# Patient Record
Sex: MALE | Race: ASIAN | NOT HISPANIC OR LATINO
[De-identification: names, ages, dates, MRNs, and addresses within clinical notes are randomized per-mention and may not be internally consistent; named-entity substitution may affect disease eponyms.]

---

## 2021-08-09 ENCOUNTER — APPOINTMENT (OUTPATIENT)
Dept: CARDIOLOGY | Facility: CLINIC | Age: 66
End: 2021-08-09
Payer: COMMERCIAL

## 2021-08-09 VITALS
TEMPERATURE: 97.8 F | DIASTOLIC BLOOD PRESSURE: 79 MMHG | WEIGHT: 165 LBS | RESPIRATION RATE: 18 BRPM | HEART RATE: 67 BPM | HEIGHT: 66 IN | OXYGEN SATURATION: 96 % | SYSTOLIC BLOOD PRESSURE: 124 MMHG | BODY MASS INDEX: 26.52 KG/M2

## 2021-08-09 DIAGNOSIS — R00.2 PALPITATIONS: ICD-10-CM

## 2021-08-09 DIAGNOSIS — Z82.49 FAMILY HISTORY OF ISCHEMIC HEART DISEASE AND OTHER DISEASES OF THE CIRCULATORY SYSTEM: ICD-10-CM

## 2021-08-09 PROBLEM — Z00.00 ENCOUNTER FOR PREVENTIVE HEALTH EXAMINATION: Status: ACTIVE | Noted: 2021-08-09

## 2021-08-09 PROCEDURE — 99204 OFFICE O/P NEW MOD 45 MIN: CPT | Mod: 25

## 2021-08-12 ENCOUNTER — APPOINTMENT (OUTPATIENT)
Dept: CARDIOLOGY | Facility: CLINIC | Age: 66
End: 2021-08-12
Payer: COMMERCIAL

## 2021-08-12 VITALS — DIASTOLIC BLOOD PRESSURE: 77 MMHG | TEMPERATURE: 97.6 F | SYSTOLIC BLOOD PRESSURE: 128 MMHG

## 2021-08-12 PROCEDURE — 93015 CV STRESS TEST SUPVJ I&R: CPT

## 2021-08-12 PROCEDURE — 93306 TTE W/DOPPLER COMPLETE: CPT

## 2021-08-12 RX ORDER — NITROGLYCERIN 0.4 MG/1
0.4 TABLET SUBLINGUAL
Qty: 1 | Refills: 0 | Status: ACTIVE | COMMUNITY
Start: 2021-08-12 | End: 1900-01-01

## 2021-08-13 ENCOUNTER — NON-APPOINTMENT (OUTPATIENT)
Age: 66
End: 2021-08-13

## 2021-08-20 ENCOUNTER — NON-APPOINTMENT (OUTPATIENT)
Age: 66
End: 2021-08-20

## 2021-08-21 PROBLEM — R00.2 PALPITATIONS: Status: ACTIVE | Noted: 2021-08-12

## 2021-08-21 NOTE — HISTORY OF PRESENT ILLNESS
[FreeTextEntry1] : 66 year-old male with HLD  presents for evaluation of lightheadedness. Patient reports that he had been feeling lightheadedness and seeing black, with near syncopal episodes once or twice monthly.. Patient reports that his HR is only 52. Patient reports substernal CP, described as stabbing, not related to exertion, lasting 10 to 45 minutes. Patient reports SOB whole day. Patient also reports palpitations with strong beats. Patient denies h/o syncope. He also reports HA. He is going to get Carotid Doppler. BP was 130-130-125. He felt slightly dizzy when he stood up. Patient completed Covid Vaccine with no issues.  ECG for CP.   I advised patient to undergo an echocardiogram and a treadmill stress test.  I advised patient to wear a Holter monitor.

## 2021-08-23 ENCOUNTER — NON-APPOINTMENT (OUTPATIENT)
Age: 66
End: 2021-08-23

## 2021-08-30 ENCOUNTER — APPOINTMENT (OUTPATIENT)
Dept: CARDIOLOGY | Facility: CLINIC | Age: 66
End: 2021-08-30
Payer: COMMERCIAL

## 2021-08-30 VITALS
BODY MASS INDEX: 26.31 KG/M2 | WEIGHT: 163 LBS | RESPIRATION RATE: 18 BRPM | TEMPERATURE: 97.8 F | HEART RATE: 57 BPM | SYSTOLIC BLOOD PRESSURE: 127 MMHG | OXYGEN SATURATION: 99 % | DIASTOLIC BLOOD PRESSURE: 81 MMHG

## 2021-08-30 PROCEDURE — 99213 OFFICE O/P EST LOW 20 MIN: CPT

## 2021-08-30 NOTE — HISTORY OF PRESENT ILLNESS
[FreeTextEntry1] : 66 year-old male with HLD  presents for followup.  \par \par Patient was last seen on 8/9/21 for evaluation of lightheadedness.  Patient underwent an echocardiogram and it showed normal LV function without significant valvular pathology. Patient underwent a treadmill stress test and completed 8.5 minutes of Vasiliy protocol.  There were downsloping ST depressions on ECG with CP.  Following treadmill stress, there was echocardiographic evidence of LAD ischemia.   I advised patient to start Plavix 75 mg (GI bleeding with ASA) and Diltiazem 30 mg BID (HR too slow for Metoprolol).  I advised patient to undergo EGD prior to cath and stent to make sure if he can be started ASA.

## 2021-08-30 NOTE — REASON FOR VISIT
[Symptom and Test Evaluation] : symptom and test evaluation [FreeTextEntry1] : 8/30/21 - Patient reports feeling much better since last visit on the medications prescribed. Patient was advised to get EGD and is awaiting endoscopy due to need to take Aspirin. I advised patient that if he does have stomach issues, he may need a stent without the Drug-Eluting stents. Patient was taking Aspirin for 1 year and had black stool and stomach pain so he stopped taking Aspirin at the beginning of this year. I advised patient to continue on Plavix and Statins. Fu in one month. \par \par \par 8/9/21 - Patient reports that he had been feeling lightheadedness and seeing black, with near syncopal episodes once or twice monthly.. Patient reports that his HR is only 52. Patient reports substernal CP, described as stabbing, not related to exertion, lasting 10 to 45 minutes. Patient reports SOB whole day. Patient also reports palpitations with strong beats. Patient denies h/o syncope. He also reports HA. He is going to get Carotid Doppler.  BP was 130-130-125. He felt slightly dizzy when he stood up. Patient completed Covid Vaccine with no issues.   ECG for CP.   I advised patient to undergo an echocardiogram and a treadmill stress test.  I advised patient to wear a Holter monitor. I advised patient to start on Plavix 75 mg, Diltiazem 30 m and Nitroglycerin 0.4 mg as needed.

## 2021-10-11 ENCOUNTER — APPOINTMENT (OUTPATIENT)
Dept: CARDIOLOGY | Facility: CLINIC | Age: 66
End: 2021-10-11
Payer: COMMERCIAL

## 2021-10-11 VITALS
SYSTOLIC BLOOD PRESSURE: 153 MMHG | RESPIRATION RATE: 18 BRPM | BODY MASS INDEX: 26.63 KG/M2 | WEIGHT: 165 LBS | OXYGEN SATURATION: 98 % | HEART RATE: 71 BPM | DIASTOLIC BLOOD PRESSURE: 96 MMHG | TEMPERATURE: 97.6 F

## 2021-10-11 PROCEDURE — 99214 OFFICE O/P EST MOD 30 MIN: CPT

## 2021-10-12 NOTE — HISTORY OF PRESENT ILLNESS
[FreeTextEntry1] : 66 year-old male with possible CAD (abnl stress test), HLD  presents for followup.  \par \par Patient was last seen on 8/30/21.  \par \par He is on Plavix 75 mg (GI bleeding with ASA) and Diltiazem 30 mg BID (HR too slow for Metoprolol).  He is on Atorvastatin 40 mg for HLD and Valsartan 80 mg for HTN.\par \par Patient underwent an echocardiogram 8/12/21 and it showed normal LV function without significant valvular pathology. \par \par Patient underwent a treadmill stress test 8/12/21 and completed 8.5 minutes of Vasiliy protocol.  There were downsloping ST depressions on ECG with CP.  Following treadmill stress, there was echocardiographic evidence of LAD ischemia.  \par \par 10/11/21 - Patient reports occasional left lower CP.  Patient reports TARIQ.  Patient had EGD and was cleared to take ASA 81 mg.  Patient wished to proceed with cardiac cath at [facility].

## 2021-10-12 NOTE — DISCUSSION/SUMMARY
[FreeTextEntry1] : 66 year-old male with possible CAD (abnl stress test), HLD  presents for followup.  \par \par Patient was last seen on 8/30/21.  \par \par He is on Plavix 75 mg (GI bleeding with ASA) and Diltiazem 30 mg BID (HR too slow for Metoprolol).  He is on Atorvastatin 40 mg for HLD and Valsartan 80 mg for HTN.\par \par Patient underwent an echocardiogram 8/12/21 and it showed normal LV function without significant valvular pathology. \par \par Patient underwent a treadmill stress test 8/12/21 and completed 8.5 minutes of Vasiliy protocol.  There were downsloping ST depressions on ECG with CP.  Following treadmill stress, there was echocardiographic evidence of LAD ischemia.  \par \par 10/11/21 - Patient reports occasional left lower CP.  Patient reports TAIRQ.  Patient had EGD and was cleared to take ASA 81 mg.  Patient wished to proceed with cardiac cath at [facility].\par \par (1) Possible CAD, abnormal stress test - Patient remains symptomatic.  I advised patient to undergo a cardiac cath.  Patient requests [facility].  I advised patient to continue Plavix 75 mg Atorvastatin 40 mg for now.  I will switch Plavix 75 mg to ASA 81 mg prior to cath in case CABG is needed.\par \par (2) HTN - His BP was elevated.  Patient is not taking his BP medication prescribed by his PCP.  I advised patient to resume.\par \par (3) Followup - pending cardiac cath.

## 2021-10-12 NOTE — REASON FOR VISIT
[Symptom and Test Evaluation] : symptom and test evaluation [FreeTextEntry1] : 10/11/21 - Patient reports occasional left lower CP.  Patient reports TARIQ.  Patient had EGD and was cleared to take ASA 81 mg.  Patient wished to proceed with cardiac cath at [facility].\par \par 8/30/21 - Patient reports feeling much better since last visit on the medications prescribed. Patient was advised to get EGD and is awaiting endoscopy due to need to take Aspirin. I advised patient that if he does have stomach issues, he may need a stent without the Drug-Eluting stents. Patient was taking Aspirin for 1 year and had black stool and stomach pain so he stopped taking Aspirin at the beginning of this year. I advised patient to continue on Plavix and Statins. Fu in one month. \par \par 8/9/21 - Patient reports that he had been feeling lightheadedness and seeing black, with near syncopal episodes once or twice monthly.. Patient reports that his HR is only 52. Patient reports substernal CP, described as stabbing, not related to exertion, lasting 10 to 45 minutes. Patient reports SOB whole day. Patient also reports palpitations with strong beats. Patient denies h/o syncope. He also reports HA. He is going to get Carotid Doppler.  BP was 130-130-125. He felt slightly dizzy when he stood up. Patient completed Covid Vaccine with no issues.   ECG for CP.   I advised patient to undergo an echocardiogram and a treadmill stress test.  I advised patient to wear a Holter monitor. I advised patient to start on Plavix 75 mg, Diltiazem 30 m and Nitroglycerin 0.4 mg as needed.

## 2021-10-22 ENCOUNTER — NON-APPOINTMENT (OUTPATIENT)
Age: 66
End: 2021-10-22

## 2021-10-25 ENCOUNTER — APPOINTMENT (OUTPATIENT)
Dept: CARDIOLOGY | Facility: CLINIC | Age: 66
End: 2021-10-25
Payer: COMMERCIAL

## 2021-10-25 ENCOUNTER — NON-APPOINTMENT (OUTPATIENT)
Age: 66
End: 2021-10-25

## 2021-10-25 VITALS
BODY MASS INDEX: 25.99 KG/M2 | RESPIRATION RATE: 17 BRPM | DIASTOLIC BLOOD PRESSURE: 83 MMHG | OXYGEN SATURATION: 96 % | WEIGHT: 161 LBS | SYSTOLIC BLOOD PRESSURE: 138 MMHG | TEMPERATURE: 98 F | HEART RATE: 85 BPM

## 2021-10-25 PROCEDURE — 99213 OFFICE O/P EST LOW 20 MIN: CPT

## 2021-10-28 NOTE — DISCUSSION/SUMMARY
[FreeTextEntry1] : 66 year-old male with possible CAD (abnl stress test), HLD  presents for followup.  \par \par Patient was last seen on 8/30/21.  \par \par He is on Plavix 75 mg (GI bleeding with ASA) and Diltiazem 30 mg BID (HR too slow for Metoprolol).  He is on Atorvastatin 40 mg for HLD and Valsartan 80 mg for HTN.\par \par Patient underwent an echocardiogram 8/12/21 and it showed normal LV function without significant valvular pathology. \par \par Patient underwent a treadmill stress test 8/12/21 and completed 8.5 minutes of Vasiliy protocol.  There were downsloping ST depressions on ECG with CP.  Following treadmill stress, there was echocardiographic evidence of LAD ischemia.  \par \par 10/11/21 - Patient reports occasional left lower CP.  Patient reports TARIQ.  Patient had EGD and was cleared to take ASA 81 mg.  Patient wished to proceed with cardiac cath at [facility].\par \par (1) Possible CAD, abnormal stress test - Patient remains symptomatic.  I advised patient to undergo a cardiac cath.  Patient requests [facility].  I advised patient to continue Plavix 75 mg Atorvastatin 40 mg for now.  I will switch Plavix 75 mg to ASA 81 mg prior to cath in case CABG is needed.\par \par (2) HTN - His BP was elevated.  Patient is not taking his BP medication prescribed by his PCP.  I advised patient to resume.\par \par (3) Followup - pending cardiac cath.

## 2021-10-28 NOTE — HISTORY OF PRESENT ILLNESS
[FreeTextEntry1] : 10/22/21 - Patient is feeling really well after getting stent. Patient is on Aspirin and Plavix. I advised patient to take it easy for 1 month. Patient has LM with 50% blockage. He still needs to continue with cholesterol medication and keep LDL under 70 and to keep BP low. FU in 2 weeks. \par \par \par \par 10/11/21 - Patient reports occasional left lower CP.  Patient reports TARIQ.  Patient had EGD and was cleared to take ASA 81 mg.  Patient wished to proceed with cardiac cath at [facility].\par \par 8/30/21 - Patient reports feeling much better since last visit on the medications prescribed. Patient was advised to get EGD and is awaiting endoscopy due to need to take Aspirin. I advised patient that if he does have stomach issues, he may need a stent without the Drug-Eluting stents. Patient was taking Aspirin for 1 year and had black stool and stomach pain so he stopped taking Aspirin at the beginning of this year. I advised patient to continue on Plavix and Statins. Fu in one month. \par \par 8/9/21 - Patient reports that he had been feeling lightheadedness and seeing black, with near syncopal episodes once or twice monthly.. Patient reports that his HR is only 52. Patient reports substernal CP, described as stabbing, not related to exertion, lasting 10 to 45 minutes. Patient reports SOB whole day. Patient also reports palpitations with strong beats. Patient denies h/o syncope. He also reports HA. He is going to get Carotid Doppler.  BP was 130-130-125. He felt slightly dizzy when he stood up. Patient completed Covid Vaccine with no issues.   ECG for CP.   I advised patient to undergo an echocardiogram and a treadmill stress test.  I advised patient to wear a Holter monitor. I advised patient to start on Plavix 75 mg, Diltiazem 30 m and Nitroglycerin 0.4 mg as needed.

## 2021-10-28 NOTE — REASON FOR VISIT
[Symptom and Test Evaluation] : symptom and test evaluation [FreeTextEntry1] : 66 year-old male with possible CAD (abnl stress test), HLD  presents for followup.  \par \par Patient was last seen on 8/30/21.  \par \par He is on Plavix 75 mg (GI bleeding with ASA) and Diltiazem 30 mg BID (HR too slow for Metoprolol).  He is on Atorvastatin 40 mg for HLD and Valsartan 80 mg for HTN.\par \par Patient underwent an echocardiogram 8/12/21 and it showed normal LV function without significant valvular pathology. \par \par Patient underwent a treadmill stress test 8/12/21 and completed 8.5 minutes of Vasiliy protocol.  There were downsloping ST depressions on ECG with CP.  Following treadmill stress, there was echocardiographic evidence of LAD ischemia.  \par \par 10/11/21 - Patient reports occasional left lower CP.  Patient reports TARIQ.  Patient had EGD and was cleared to take ASA 81 mg.  Patient wished to proceed with cardiac cath at [facility].\par

## 2021-11-15 ENCOUNTER — APPOINTMENT (OUTPATIENT)
Dept: CARDIOLOGY | Facility: CLINIC | Age: 66
End: 2021-11-15
Payer: COMMERCIAL

## 2021-11-15 VITALS
RESPIRATION RATE: 17 BRPM | DIASTOLIC BLOOD PRESSURE: 76 MMHG | WEIGHT: 165 LBS | TEMPERATURE: 97.3 F | SYSTOLIC BLOOD PRESSURE: 123 MMHG | BODY MASS INDEX: 26.63 KG/M2 | OXYGEN SATURATION: 97 % | HEART RATE: 87 BPM

## 2021-11-15 PROCEDURE — 99213 OFFICE O/P EST LOW 20 MIN: CPT

## 2021-11-17 NOTE — HISTORY OF PRESENT ILLNESS
[FreeTextEntry1] : 11/15/21 Patient reports feeling much better. No more SOB or dizziness. Patient has focal CP as if something is there,  not related to exertion. Patient reports heart burn in the mornings and would like to try Famotidine. Patient was able to run about the 4 flights of stairs to the office today. \par \par \par 10/22/21 - Patient is feeling really well after getting stent. Patient is on Aspirin and Plavix. I advised patient to take it easy for 1 month. Patient has LM with 50% blockage. He still needs to continue with cholesterol medication and keep LDL under 70 and to keep BP low. FU in 2 weeks. \par \par 10/11/21 - Patient reports occasional left lower CP.  Patient reports TARIQ.  Patient had EGD and was cleared to take ASA 81 mg.  Patient wished to proceed with cardiac cath at [facility].\par \par 8/30/21 - Patient reports feeling much better since last visit on the medications prescribed. Patient was advised to get EGD and is awaiting endoscopy due to need to take Aspirin. I advised patient that if he does have stomach issues, he may need a stent without the Drug-Eluting stents. Patient was taking Aspirin for 1 year and had black stool and stomach pain so he stopped taking Aspirin at the beginning of this year. I advised patient to continue on Plavix and Statins. Fu in one month. \par \par 8/9/21 - Patient reports that he had been feeling lightheadedness and seeing black, with near syncopal episodes once or twice monthly.. Patient reports that his HR is only 52. Patient reports substernal CP, described as stabbing, not related to exertion, lasting 10 to 45 minutes. Patient reports SOB whole day. Patient also reports palpitations with strong beats. Patient denies h/o syncope. He also reports HA. He is going to get Carotid Doppler.  BP was 130-130-125. He felt slightly dizzy when he stood up. Patient completed Covid Vaccine with no issues.   ECG for CP.   I advised patient to undergo an echocardiogram and a treadmill stress test.  I advised patient to wear a Holter monitor. I advised patient to start on Plavix 75 mg, Diltiazem 30 m and Nitroglycerin 0.4 mg as needed.

## 2021-11-17 NOTE — REASON FOR VISIT
[Symptom and Test Evaluation] : symptom and test evaluation [FreeTextEntry1] : 66 year-old male with CAD s/p LAD stent 10/22/21 at [facility] [place]  presents for followup.  \par \par Patient was last seen on 10/22/21 for CAD.\par \par He is on ASA 81 mg, Plavix 75 mg, ant Atorvastatin 40 mg for CAD.  He is on Diltiazem 30 mg BID (HR too slow for Metoprolol)  and Valsartan 80 mg for HTN.\par \par Patient underwent an echocardiogram 8/12/21 and it showed normal LV function without significant valvular pathology. \par \par Patient underwent a treadmill stress test 8/12/21 and completed 8.5 minutes of Vasiliy protocol.  There were downsloping ST depressions on ECG with CP.  Following treadmill stress, there was echocardiographic evidence of LAD ischemia.  \par

## 2021-12-13 ENCOUNTER — APPOINTMENT (OUTPATIENT)
Dept: CARDIOLOGY | Facility: CLINIC | Age: 66
End: 2021-12-13
Payer: COMMERCIAL

## 2021-12-13 VITALS
OXYGEN SATURATION: 97 % | BODY MASS INDEX: 25.82 KG/M2 | DIASTOLIC BLOOD PRESSURE: 75 MMHG | WEIGHT: 160 LBS | HEART RATE: 72 BPM | TEMPERATURE: 97.5 F | SYSTOLIC BLOOD PRESSURE: 128 MMHG | RESPIRATION RATE: 18 BRPM

## 2021-12-13 PROCEDURE — 99213 OFFICE O/P EST LOW 20 MIN: CPT

## 2022-01-13 NOTE — REASON FOR VISIT
[Symptom and Test Evaluation] : symptom and test evaluation [FreeTextEntry1] : 66 year-old male with CAD s/p LAD stent 10/22/21 at [facility] [place]  presents for followup.  \par \par Patient was last seen on 11/15/2 1for CAD.  Patient reports heart burn in the mornings and would like to try Famotidine. \par \par He is on ASA 81 mg, Plavix 75 mg, ant Atorvastatin 40 mg for CAD.  He is on Diltiazem 30 mg BID (HR too slow for Metoprolol)  and Valsartan 80 mg for HTN.\par \par Patient underwent an echocardiogram 8/12/21 and it showed normal LV function without significant valvular pathology. \par \par Patient underwent a treadmill stress test 8/12/21 and completed 8.5 minutes of Vasiliy protocol.  There were downsloping ST depressions on ECG with CP.  Following treadmill stress, there was echocardiographic evidence of LAD ischemia.  \par

## 2022-01-13 NOTE — HISTORY OF PRESENT ILLNESS
[FreeTextEntry1] : 12/13/21 - Patient has been stable. Patient reports decreased heart burn. Patient denies CP or SOB. I advised patient to continue current medications. FU 3 months. \par \par 11/15/21 Patient reports feeling much better. No more SOB or dizziness. Patient has focal CP as if something is there,  not related to exertion. Patient reports heart burn in the mornings and would like to try Famotidine. Patient was able to run about the 4 flights of stairs to the office today. \par \par 10/22/21 - Patient is feeling really well after getting stent. Patient is on Aspirin and Plavix. I advised patient to take it easy for 1 month. Patient has LM with 50% blockage. He still needs to continue with cholesterol medication and keep LDL under 70 and to keep BP low. FU in 2 weeks. \par \par 10/11/21 - Patient reports occasional left lower CP.  Patient reports TARIQ.  Patient had EGD and was cleared to take ASA 81 mg.  Patient wished to proceed with cardiac cath at [facility].\par \par 8/30/21 - Patient reports feeling much better since last visit on the medications prescribed. Patient was advised to get EGD and is awaiting endoscopy due to need to take Aspirin. I advised patient that if he does have stomach issues, he may need a stent without the Drug-Eluting stents. Patient was taking Aspirin for 1 year and had black stool and stomach pain so he stopped taking Aspirin at the beginning of this year. I advised patient to continue on Plavix and Statins. Fu in one month. \par \par 8/9/21 - Patient reports that he had been feeling lightheadedness and seeing black, with near syncopal episodes once or twice monthly.. Patient reports that his HR is only 52. Patient reports substernal CP, described as stabbing, not related to exertion, lasting 10 to 45 minutes. Patient reports SOB whole day. Patient also reports palpitations with strong beats. Patient denies h/o syncope. He also reports HA. He is going to get Carotid Doppler.  BP was 130-130-125. He felt slightly dizzy when he stood up. Patient completed Covid Vaccine with no issues.   ECG for CP.   I advised patient to undergo an echocardiogram and a treadmill stress test.  I advised patient to wear a Holter monitor. I advised patient to start on Plavix 75 mg, Diltiazem 30 m and Nitroglycerin 0.4 mg as needed.

## 2022-01-14 ENCOUNTER — APPOINTMENT (OUTPATIENT)
Dept: CARDIOLOGY | Facility: CLINIC | Age: 67
End: 2022-01-14
Payer: COMMERCIAL

## 2022-01-14 ENCOUNTER — NON-APPOINTMENT (OUTPATIENT)
Age: 67
End: 2022-01-14

## 2022-01-14 VITALS
TEMPERATURE: 97.8 F | DIASTOLIC BLOOD PRESSURE: 77 MMHG | RESPIRATION RATE: 18 BRPM | SYSTOLIC BLOOD PRESSURE: 134 MMHG | HEART RATE: 72 BPM | OXYGEN SATURATION: 95 %

## 2022-01-14 PROCEDURE — 93000 ELECTROCARDIOGRAM COMPLETE: CPT | Mod: 59

## 2022-01-14 PROCEDURE — 99214 OFFICE O/P EST MOD 30 MIN: CPT | Mod: 25

## 2022-01-14 PROCEDURE — 93015 CV STRESS TEST SUPVJ I&R: CPT

## 2022-02-08 ENCOUNTER — APPOINTMENT (OUTPATIENT)
Dept: CARDIOLOGY | Facility: CLINIC | Age: 67
End: 2022-02-08

## 2022-03-10 NOTE — HISTORY OF PRESENT ILLNESS
[FreeTextEntry1] : 1/14/22 - Patient reports left lower CP, similar to prior to stent. Patient reports SOB. Patient underwent a treadmill stress test and completed 9 minutes of Vasiliy protocol. There were horizontal ST depressions on ECG but no symptoms. Following treadmill stress, there was no echocardiographic evidence of ischemia. I advised patient to try Famotidine 40 mg.\par \par 12/13/21 - Patient has been stable.  Patient reports decreased heart burn.  Patient denies CP or SOB.  I advised patient to continue current medications.  FU 3 months. \par \par 11/15/21 Patient reports feeling much better. No more SOB or dizziness. Patient has focal CP as if something is there,  not related to exertion. Patient reports heart burn in the mornings and would like to try Famotidine. Patient was able to run about the 4 flights of stairs to the office today. \par \par 10/22/21 - Patient is feeling really well after getting stent. Patient is on Aspirin and Plavix. I advised patient to take it easy for 1 month. Patient has LM with 50% blockage. He still needs to continue with cholesterol medication and keep LDL under 70 and to keep BP low. FU in 2 weeks. \par \par 10/11/21 - Patient reports occasional left lower CP.  Patient reports TARIQ.  Patient had EGD and was cleared to take ASA 81 mg.  Patient wished to proceed with cardiac cath at [facility].\par \par 8/30/21 - Patient reports feeling much better since last visit on the medications prescribed. Patient was advised to get EGD and is awaiting endoscopy due to need to take Aspirin. I advised patient that if he does have stomach issues, he may need a stent without the Drug-Eluting stents. Patient was taking Aspirin for 1 year and had black stool and stomach pain so he stopped taking Aspirin at the beginning of this year. I advised patient to continue on Plavix and Statins. Fu in one month. \par \par 8/9/21 - Patient reports that he had been feeling lightheadedness and seeing black, with near syncopal episodes once or twice monthly.. Patient reports that his HR is only 52. Patient reports substernal CP, described as stabbing, not related to exertion, lasting 10 to 45 minutes. Patient reports SOB whole day. Patient also reports palpitations with strong beats. Patient denies h/o syncope. He also reports HA. He is going to get Carotid Doppler.  BP was 130-130-125. He felt slightly dizzy when he stood up. Patient completed Covid Vaccine with no issues.   ECG for CP.   I advised patient to undergo an echocardiogram and a treadmill stress test.  I advised patient to wear a Holter monitor. I advised patient to start on Plavix 75 mg, Diltiazem 30 m and Nitroglycerin 0.4 mg as needed.

## 2022-03-10 NOTE — REASON FOR VISIT
[Symptom and Test Evaluation] : symptom and test evaluation [FreeTextEntry1] : 66 year-old male with CAD s/p LAD stent 10/22/21 at [facility], HLD  presents for followup.  \par \par Patient was last seen on 12/13/21.\par \par He is on ASA 81 mg, Plavix 75 mg, ant Atorvastatin 40 mg for CAD.  He is on Diltiazem 30 mg BID (HR too slow for Metoprolol)  and Valsartan 80 mg for HTN.\par \par Patient underwent an echocardiogram 8/12/21 and it showed normal LV function without significant valvular pathology. \par \par Patient underwent a treadmill stress test 8/12/21 and completed 8.5 minutes of Vasiliy protocol.  There were downsloping ST depressions on ECG with CP.  Following treadmill stress, there was echocardiographic evidence of LAD ischemia.  \par \par Cath at [facility] 10/22/21 by Dr. Matos showed 50% LM, 80% ostial LAD, 95% p LAD, s/p LAD stent.

## 2022-03-14 ENCOUNTER — APPOINTMENT (OUTPATIENT)
Dept: CARDIOLOGY | Facility: CLINIC | Age: 67
End: 2022-03-14
Payer: MEDICARE

## 2022-03-14 VITALS
BODY MASS INDEX: 25.5 KG/M2 | DIASTOLIC BLOOD PRESSURE: 75 MMHG | RESPIRATION RATE: 18 BRPM | SYSTOLIC BLOOD PRESSURE: 122 MMHG | HEART RATE: 68 BPM | WEIGHT: 158 LBS | OXYGEN SATURATION: 97 % | TEMPERATURE: 97.6 F

## 2022-03-14 PROCEDURE — 99213 OFFICE O/P EST LOW 20 MIN: CPT

## 2022-03-14 NOTE — HISTORY OF PRESENT ILLNESS
[FreeTextEntry1] : 3/14/22 - Patient reports feeling better with decreased CP.  He still has cardiac clearance prior to left lower CP not related to exertion.  He doesn't think it's stomach related.  He takes Famotidine PRN.  Patient denies SOB.  Patient denies palpitations.  He is no longer on Valsartan 80 mg because his BP has been fine.  I advised patient to continue current medications.  FU 3 months. \par \par 1/14/22 - Patient reports left lower CP, similar to prior to stent.  Patient reports SOB.   Patient underwent a treadmill stress test and completed 9 minutes of Vasiliy protocol.  There were horizontal ST depressions on ECG but no symptoms.  Following treadmill stress, there was no echocardiographic evidence of ischemia.  I advised patient to try Famotidine 40 mg.\par \par 12/13/21 - Patient has been stable.  Patient reports decreased heart burn.  Patient denies CP or SOB.  I advised patient to continue current medications.  FU 3 months. \par \par 11/15/21 Patient reports feeling much better. No more SOB or dizziness. Patient has focal CP as if something is there,  not related to exertion. Patient reports heart burn in the mornings and would like to try Famotidine. Patient was able to run about the 4 flights of stairs to the office today. \par \par 10/22/21 - Patient is feeling really well after getting stent. Patient is on Aspirin and Plavix. I advised patient to take it easy for 1 month. Patient has LM with 50% blockage. He still needs to continue with cholesterol medication and keep LDL under 70 and to keep BP low. FU in 2 weeks. \par \par 10/11/21 - Patient reports occasional left lower CP.  Patient reports TARIQ.  Patient had EGD and was cleared to take ASA 81 mg.  Patient wished to proceed with cardiac cath at [facility].\par \par 8/30/21 - Patient reports feeling much better since last visit on the medications prescribed. Patient was advised to get EGD and is awaiting endoscopy due to need to take Aspirin. I advised patient that if he does have stomach issues, he may need a stent without the Drug-Eluting stents. Patient was taking Aspirin for 1 year and had black stool and stomach pain so he stopped taking Aspirin at the beginning of this year. I advised patient to continue on Plavix and Statins. Fu in one month. \par \par 8/9/21 - Patient reports that he had been feeling lightheadedness and seeing black, with near syncopal episodes once or twice monthly.. Patient reports that his HR is only 52. Patient reports substernal CP, described as stabbing, not related to exertion, lasting 10 to 45 minutes. Patient reports SOB whole day. Patient also reports palpitations with strong beats. Patient denies h/o syncope. He also reports HA. He is going to get Carotid Doppler.  BP was 130-130-125. He felt slightly dizzy when he stood up. Patient completed Covid Vaccine with no issues.   ECG for CP.   I advised patient to undergo an echocardiogram and a treadmill stress test.  I advised patient to wear a Holter monitor. I advised patient to start on Plavix 75 mg, Diltiazem 30 m and Nitroglycerin 0.4 mg as needed.

## 2022-03-14 NOTE — REASON FOR VISIT
[FreeTextEntry1] : 66 year-old male with CAD s/p LAD stent 10/22/21 at [facility], [place]  presents for followup.  \par \par Patient was last seen on 1/14/22 for left lower CP, similar to prior to stent.  Patient underwent a treadmill stress test and completed 9 minutes of Vasiliy protocol.  There were horizontal ST depressions on ECG but no symptoms.  Following treadmill stress, there was no echocardiographic evidence of ischemia.  I advised patient to try Famotidine 40 mg.\par \par He is on ASA 81 mg, Plavix 75 mg, ant Atorvastatin 40 mg for CAD.  He is on Diltiazem 30 mg BID (HR too slow for Metoprolol) for HTN.  He is no longer on Valsartan 80 mg for HTN.\par \par Patient underwent an echocardiogram 8/12/21 and it showed normal LV function without significant valvular pathology. \par \par Patient underwent a treadmill stress test 8/12/21 and completed 8.5 minutes of Vasiliy protocol.  There were downsloping ST depressions on ECG with CP.  Following treadmill stress, there was echocardiographic evidence of LAD ischemia.  \par \par Cath at [facility] 10/22/21 by Dr. Matos showed 50% LM, 80% ostial LAD, 95% p LAD, s/p LAD stent.

## 2022-06-13 ENCOUNTER — NON-APPOINTMENT (OUTPATIENT)
Age: 67
End: 2022-06-13

## 2022-06-13 ENCOUNTER — APPOINTMENT (OUTPATIENT)
Dept: CARDIOLOGY | Facility: CLINIC | Age: 67
End: 2022-06-13
Payer: MEDICARE

## 2022-06-13 VITALS
SYSTOLIC BLOOD PRESSURE: 135 MMHG | DIASTOLIC BLOOD PRESSURE: 81 MMHG | TEMPERATURE: 97.4 F | RESPIRATION RATE: 18 BRPM | OXYGEN SATURATION: 96 % | HEART RATE: 72 BPM | WEIGHT: 153 LBS | BODY MASS INDEX: 24.7 KG/M2

## 2022-06-13 DIAGNOSIS — T14.8XXA OTHER INJURY OF UNSPECIFIED BODY REGION, INITIAL ENCOUNTER: ICD-10-CM

## 2022-06-13 DIAGNOSIS — E55.9 VITAMIN D DEFICIENCY, UNSPECIFIED: ICD-10-CM

## 2022-06-13 DIAGNOSIS — R94.39 ABNORMAL RESULT OF OTHER CARDIOVASCULAR FUNCTION STUDY: ICD-10-CM

## 2022-06-13 PROCEDURE — 93000 ELECTROCARDIOGRAM COMPLETE: CPT

## 2022-06-13 PROCEDURE — 99214 OFFICE O/P EST MOD 30 MIN: CPT | Mod: 25

## 2022-06-13 RX ORDER — VALSARTAN 80 MG/1
80 TABLET, COATED ORAL
Refills: 0 | Status: DISCONTINUED | COMMUNITY
End: 2022-06-13

## 2022-06-13 RX ORDER — FOLIC ACID 1 MG/1
1 TABLET ORAL DAILY
Qty: 90 | Refills: 1 | Status: ACTIVE | COMMUNITY
Start: 2022-06-13 | End: 1900-01-01

## 2022-06-13 RX ORDER — FAMOTIDINE 40 MG/1
40 TABLET, FILM COATED ORAL DAILY
Qty: 90 | Refills: 1 | Status: ACTIVE | COMMUNITY
Start: 2021-11-15 | End: 1900-01-01

## 2022-06-13 NOTE — HISTORY OF PRESENT ILLNESS
[FreeTextEntry1] : 6/13/22 - Patient reports not feeling well.  Patient reports persistent localized left lower CP not related to exertion, lasting hours, worse in AM.  Patient reports episodes of TARIQ.  Patient reports occasional lightheadedness.  I advised patient to undergo a repeat cath.  \par \par 3/14/22 - Patient reports feeling better with decreased CP.  He still has CP not related to exertion.  He doesn't think it's stomach related.  He takes Famotidine PRN.  Patient denies SOB.  Patient denies palpitations.  He is no longer on Valsartan 80 mg because his BP has been fine.  I advised patient to continue current medications.  FU 3 months. \par \par 1/14/22 - Patient reports left lower CP, similar to prior to stent.  Patient reports SOB.   Patient underwent a treadmill stress test and completed 9 minutes of Vasiliy protocol.  There were horizontal ST depressions on ECG but no symptoms.  Following treadmill stress, there was no echocardiographic evidence of ischemia.  I advised patient to try Famotidine 40 mg.\par \par 12/13/21 - Patient has been stable.  Patient reports decreased heart burn.  Patient denies CP or SOB.  I advised patient to continue current medications.  FU 3 months. \par \par 11/15/21 Patient reports feeling much better. No more SOB or dizziness. Patient has focal CP as if something is there,  not related to exertion. Patient reports heart burn in the mornings and would like to try Famotidine. Patient was able to run about the 4 flights of stairs to the office today. \par \par 10/22/21 - Patient is feeling really well after getting stent. Patient is on Aspirin and Plavix. I advised patient to take it easy for 1 month. Patient has LM with 50% blockage. He still needs to continue with cholesterol medication and keep LDL under 70 and to keep BP low. FU in 2 weeks. \par \par 10/11/21 - Patient reports occasional left lower CP.  Patient reports TARIQ.  Patient had EGD and was cleared to take ASA 81 mg.  Patient wished to proceed with cardiac cath at [facility].\par \par 8/30/21 - Patient reports feeling much better since last visit on the medications prescribed. Patient was advised to get EGD and is awaiting endoscopy due to need to take Aspirin. I advised patient that if he does have stomach issues, he may need a stent without the Drug-Eluting stents. Patient was taking Aspirin for 1 year and had black stool and stomach pain so he stopped taking Aspirin at the beginning of this year. I advised patient to continue on Plavix and Statins. Fu in one month. \par \par 8/9/21 - Patient reports that he had been feeling lightheadedness and seeing black, with near syncopal episodes once or twice monthly.. Patient reports that his HR is only 52. Patient reports substernal CP, described as stabbing, not related to exertion, lasting 10 to 45 minutes. Patient reports SOB whole day. Patient also reports palpitations with strong beats. Patient denies h/o syncope. He also reports HA. He is going to get Carotid Doppler.  BP was 130-130-125. He felt slightly dizzy when he stood up. Patient completed Covid Vaccine with no issues.   ECG for CP.   I advised patient to undergo an echocardiogram and a treadmill stress test.  I advised patient to wear a Holter monitor. I advised patient to start on Plavix 75 mg, Diltiazem 30 m and Nitroglycerin 0.4 mg as needed.

## 2022-06-13 NOTE — REASON FOR VISIT
[FreeTextEntry1] : 66 year-old male with CAD s/p LAD stent 10/22/21 at [facility], [place]  presents for followup.  \par \par Patient was last seen on 3/14/22 for followup.  Patient reported feeling better with decreased CP.   He was no longer on Valsartan 80 mg because his BP has been fine.  \par \par He is on ASA 81 mg, Plavix 75 mg, ant Atorvastatin 40 mg for CAD.  He is on Diltiazem 30 mg BID (HR too slow for Metoprolol) for HTN.  He is no longer on Valsartan 80 mg for HTN.\par \par Patient underwent an echocardiogram 8/12/21 and it showed normal LV function without significant valvular pathology. \par \par Patient underwent a treadmill stress test 8/12/21 and completed 8.5 minutes of Vasiliy protocol.  There were downsloping ST depressions on ECG with CP.  Following treadmill stress, there was echocardiographic evidence of LAD ischemia.  \par \par Patient underwent a treadmill stress test 1/14/22 and completed 9 minutes of Vasiliy protocol.  There were horizontal ST depressions on ECG but no symptoms.  Following treadmill stress, there was no echocardiographic evidence of ischemia.\par \par Cath at [facility] 10/22/21 by Dr. Matos showed 50% LM, 80% ostial LAD, 95% p LAD, s/p LAD stent.

## 2022-06-16 LAB
25(OH)D3 SERPL-MCNC: 34.8 NG/ML
ALBUMIN SERPL ELPH-MCNC: 4.6 G/DL
ALP BLD-CCNC: 61 U/L
ALT SERPL-CCNC: 28 U/L
ANION GAP SERPL CALC-SCNC: 9 MMOL/L
AST SERPL-CCNC: 27 U/L
BASOPHILS # BLD AUTO: 0.05 K/UL
BASOPHILS NFR BLD AUTO: 0.7 %
BILIRUB SERPL-MCNC: 0.7 MG/DL
BUN SERPL-MCNC: 12 MG/DL
CALCIUM SERPL-MCNC: 9 MG/DL
CHLORIDE SERPL-SCNC: 107 MMOL/L
CHOLEST SERPL-MCNC: 163 MG/DL
CO2 SERPL-SCNC: 26 MMOL/L
CREAT SERPL-MCNC: 1.18 MG/DL
EGFR: 68 ML/MIN/1.73M2
EOSINOPHIL # BLD AUTO: 0.15 K/UL
EOSINOPHIL NFR BLD AUTO: 2.2 %
ESTIMATED AVERAGE GLUCOSE: 123 MG/DL
GLUCOSE SERPL-MCNC: 101 MG/DL
HBA1C MFR BLD HPLC: 5.9 %
HCT VFR BLD CALC: 42.1 %
HDLC SERPL-MCNC: 57 MG/DL
HGB BLD-MCNC: 14.2 G/DL
IMM GRANULOCYTES NFR BLD AUTO: 0.3 %
LDLC SERPL CALC-MCNC: 83 MG/DL
LYMPHOCYTES # BLD AUTO: 2.2 K/UL
LYMPHOCYTES NFR BLD AUTO: 33 %
MAN DIFF?: NORMAL
MCHC RBC-ENTMCNC: 31.3 PG
MCHC RBC-ENTMCNC: 33.7 GM/DL
MCV RBC AUTO: 92.9 FL
MONOCYTES # BLD AUTO: 0.51 K/UL
MONOCYTES NFR BLD AUTO: 7.6 %
NEUTROPHILS # BLD AUTO: 3.74 K/UL
NEUTROPHILS NFR BLD AUTO: 56.2 %
NONHDLC SERPL-MCNC: 106 MG/DL
PLATELET # BLD AUTO: 151 K/UL
POTASSIUM SERPL-SCNC: 4.4 MMOL/L
PROT SERPL-MCNC: 6.7 G/DL
RBC # BLD: 4.53 M/UL
RBC # FLD: 13 %
SODIUM SERPL-SCNC: 142 MMOL/L
TRIGL SERPL-MCNC: 115 MG/DL
TSH SERPL-ACNC: 3.41 UIU/ML
WBC # FLD AUTO: 6.67 K/UL

## 2022-06-23 ENCOUNTER — NON-APPOINTMENT (OUTPATIENT)
Age: 67
End: 2022-06-23

## 2022-07-11 ENCOUNTER — APPOINTMENT (OUTPATIENT)
Dept: CARDIOLOGY | Facility: CLINIC | Age: 67
End: 2022-07-11

## 2022-07-11 VITALS
TEMPERATURE: 97.7 F | SYSTOLIC BLOOD PRESSURE: 116 MMHG | OXYGEN SATURATION: 97 % | DIASTOLIC BLOOD PRESSURE: 71 MMHG | HEART RATE: 63 BPM | BODY MASS INDEX: 26.47 KG/M2 | WEIGHT: 164 LBS | RESPIRATION RATE: 18 BRPM

## 2022-07-11 PROCEDURE — 99213 OFFICE O/P EST LOW 20 MIN: CPT

## 2022-07-20 NOTE — REASON FOR VISIT
[FreeTextEntry1] : 67 year-old male with CAD s/p LAD stent 10/22/21 at [facility], [place],  presents for followup.  \par \par Patient was last seen on 6/13/22 for persistent localized left lower CP not related to exertion, lasting hours, worse in AM.   I advised patient to undergo a repeat cath.  Cath 6/23/22 at [facility] with Dr. Matos showed 40-50 LM stenosis, 50% mid LAD, patent proximal LAD stent, EF 60%.  Patient was started on Imdur 30 mg with improvement in symptom.\par \par He is on ASA 81 mg, Plavix 75 mg, ant Atorvastatin 40 mg for CAD.  He is on Diltiazem 30 mg BID (HR too slow for Metoprolol) for HTN.  He is no longer on Valsartan 80 mg for HTN.\par \par Patient underwent an echocardiogram 8/12/21 and it showed normal LV function without significant valvular pathology. \par \par Patient underwent a treadmill stress test 8/12/21 and completed 8.5 minutes of Vasiliy protocol.  There were downsloping ST depressions on ECG with CP.  Following treadmill stress, there was echocardiographic evidence of LAD ischemia.  \par \par Patient underwent a treadmill stress test 1/14/22 and completed 9 minutes of Vasiliy protocol.  There were horizontal ST depressions on ECG but no symptoms.  Following treadmill stress, there was no echocardiographic evidence of ischemia.\par \par Cath at [facility] 10/22/21 by Dr. Matos showed 50% LM, 80% ostial LAD, 95% p LAD, s/p LAD stent.

## 2022-07-20 NOTE — HISTORY OF PRESENT ILLNESS
[FreeTextEntry1] : 7/11/22- Patient had cardiac cath and only showed mild blockage. Patient was told to just take 1/2 tab BID of Imdur 30 mg due to headache. Patient reports feeling less CP and no more SOB on Imdur. He also reports better oxygen saturation. I advised patient that he may resume mild exercising. FU in 3 months. \par \par 6/13/22 - Patient reports not feeling well.  Patient reports persistent localized left lower CP not related to exertion, lasting hours, worse in AM.  Patient reports episodes of TARIQ.  Patient reports occasional lightheadedness.  I advised patient to undergo a repeat cath.  \par \par 3/14/22 - Patient reports feeling better with decreased CP.  He still has CP not related to exertion.  He doesn't think it's stomach related.  He takes Famotidine PRN.  Patient denies SOB.  Patient denies palpitations.  He is no longer on Valsartan 80 mg because his BP has been fine.  I advised patient to continue current medications.  FU 3 months. \par \par 1/14/22 - Patient reports left lower CP, similar to prior to stent.  Patient reports SOB.   Patient underwent a treadmill stress test and completed 9 minutes of Vasiliy protocol.  There were horizontal ST depressions on ECG but no symptoms.  Following treadmill stress, there was no echocardiographic evidence of ischemia.  I advised patient to try Famotidine 40 mg.\par \par 12/13/21 - Patient has been stable.  Patient reports decreased heart burn.  Patient denies CP or SOB.  I advised patient to continue current medications.  FU 3 months. \par \par 11/15/21 Patient reports feeling much better. No more SOB or dizziness. Patient has focal CP as if something is there,  not related to exertion. Patient reports heart burn in the mornings and would like to try Famotidine. Patient was able to run about the 4 flights of stairs to the office today. \par \par 10/22/21 - Patient is feeling really well after getting stent. Patient is on Aspirin and Plavix. I advised patient to take it easy for 1 month. Patient has LM with 50% blockage. He still needs to continue with cholesterol medication and keep LDL under 70 and to keep BP low. FU in 2 weeks. \par \par 10/11/21 - Patient reports occasional left lower CP.  Patient reports TARIQ.  Patient had EGD and was cleared to take ASA 81 mg.  Patient wished to proceed with cardiac cath at [facility].\par \par 8/30/21 - Patient reports feeling much better since last visit on the medications prescribed. Patient was advised to get EGD and is awaiting endoscopy due to need to take Aspirin. I advised patient that if he does have stomach issues, he may need a stent without the Drug-Eluting stents. Patient was taking Aspirin for 1 year and had black stool and stomach pain so he stopped taking Aspirin at the beginning of this year. I advised patient to continue on Plavix and Statins. Fu in one month. \par \par 8/9/21 - Patient reports that he had been feeling lightheadedness and seeing black, with near syncopal episodes once or twice monthly.. Patient reports that his HR is only 52. Patient reports substernal CP, described as stabbing, not related to exertion, lasting 10 to 45 minutes. Patient reports SOB whole day. Patient also reports palpitations with strong beats. Patient denies h/o syncope. He also reports HA. He is going to get Carotid Doppler.  BP was 130-130-125. He felt slightly dizzy when he stood up. Patient completed Covid Vaccine with no issues.   ECG for CP.   I advised patient to undergo an echocardiogram and a treadmill stress test.  I advised patient to wear a Holter monitor. I advised patient to start on Plavix 75 mg, Diltiazem 30 m and Nitroglycerin 0.4 mg as needed.

## 2022-10-10 ENCOUNTER — NON-APPOINTMENT (OUTPATIENT)
Age: 67
End: 2022-10-10

## 2022-10-10 ENCOUNTER — APPOINTMENT (OUTPATIENT)
Dept: CARDIOLOGY | Facility: CLINIC | Age: 67
End: 2022-10-10

## 2022-10-10 VITALS
SYSTOLIC BLOOD PRESSURE: 129 MMHG | DIASTOLIC BLOOD PRESSURE: 74 MMHG | WEIGHT: 168 LBS | HEART RATE: 72 BPM | OXYGEN SATURATION: 96 % | RESPIRATION RATE: 18 BRPM | BODY MASS INDEX: 27.12 KG/M2 | TEMPERATURE: 95.8 F

## 2022-10-10 DIAGNOSIS — R06.02 SHORTNESS OF BREATH: ICD-10-CM

## 2022-10-10 PROCEDURE — 93000 ELECTROCARDIOGRAM COMPLETE: CPT

## 2022-10-10 PROCEDURE — 99214 OFFICE O/P EST MOD 30 MIN: CPT | Mod: 25

## 2022-10-10 RX ORDER — ATORVASTATIN CALCIUM 80 MG/1
80 TABLET, FILM COATED ORAL DAILY
Qty: 30 | Refills: 5 | Status: ACTIVE | COMMUNITY
Start: 1900-01-01 | End: 1900-01-01

## 2022-10-18 ENCOUNTER — APPOINTMENT (OUTPATIENT)
Dept: CARDIOLOGY | Facility: CLINIC | Age: 67
End: 2022-10-18

## 2022-10-18 PROCEDURE — 93015 CV STRESS TEST SUPVJ I&R: CPT

## 2022-10-18 PROCEDURE — A9500: CPT

## 2022-10-18 PROCEDURE — 78452 HT MUSCLE IMAGE SPECT MULT: CPT

## 2022-10-18 RX ORDER — BACITRACIN 500 [IU]/G
500 OINTMENT TOPICAL 3 TIMES DAILY
Qty: 1 | Refills: 0 | Status: DISCONTINUED | COMMUNITY
Start: 2021-10-25 | End: 2022-10-18

## 2022-10-24 PROBLEM — R06.02 SHORTNESS OF BREATH: Status: ACTIVE | Noted: 2022-01-14

## 2022-10-24 NOTE — REASON FOR VISIT
[FreeTextEntry1] : 67 year-old male with CAD s/p LAD stent 10/22/21 at [facility], [place],  presents for followup.  \par \par Patient was last seen on 7/11/22 for followup.\par \par He is on ASA 81 mg, Plavix 75 mg, ant Atorvastatin 40 mg for CAD.  He is on Diltiazem 30 mg BID (HR too slow for Metoprolol) for HTN.  He is no longer on Valsartan 80 mg for HTN.  He is on Imdur 30 mg (1/2 tab BID) for CP.\par \par Cath 6/23/22 at [facility] with Dr. Matos showed 40-50 LM stenosis, 50% mid LAD, patent proximal LAD stent, EF 60%.  Patient was started on Imdur 30 mg with improvement in symptom.\par \par Patient underwent a treadmill stress test 1/14/22 and completed 9 minutes of Vasiliy protocol.  There were horizontal ST depressions on ECG but no symptoms.  Following treadmill stress, there was no echocardiographic evidence of ischemia.\par \par Patient underwent an echocardiogram 8/12/21 and it showed normal LV function without significant valvular pathology. \par \par Patient underwent a treadmill stress test 8/12/21 and completed 8.5 minutes of Vasiliy protocol.  There were downsloping ST depressions on ECG with CP.  Following treadmill stress, there was echocardiographic evidence of LAD ischemia.  \par \par Cath at [facility] 10/22/21 by Dr. Matso showed 50% LM, 80% ostial LAD, 95% p LAD, s/p LAD stent.

## 2022-10-24 NOTE — HISTORY OF PRESENT ILLNESS
[FreeTextEntry1] : 10/10/22 - Patient reports that back in September he was feeling weakness and sleepiness but is now better. He reports lower left CP  and SOB with exertion. Patient reports that when he takes nitroglycerine he feels better. He reports that he tries to exercise daily. His LDL was 83 in June. I advised patient to increase dose of Atorvastatin to 80 mg. I advised patient to undergo nuclear stress test.   \par \par 7/11/22- Patient had cardiac cath and only showed mild blockage. Patient was told to just take 1/2 tab BID of Imdur 30 mg due to headache. Patient reports feeling less CP and no more SOB on Imdur. He also reports better oxygen saturation. I advised patient that he may resume mild exercising. FU in 3 months. \par \par 6/13/22 - Patient reports not feeling well.  Patient reports persistent localized left lower CP not related to exertion, lasting hours, worse in AM.  Patient reports episodes of TARIQ.  Patient reports occasional lightheadedness.  I advised patient to undergo a repeat cath.  \par \par 3/14/22 - Patient reports feeling better with decreased CP.  He still has CP not related to exertion.  He doesn't think it's stomach related.  He takes Famotidine PRN.  Patient denies SOB.  Patient denies palpitations.  He is no longer on Valsartan 80 mg because his BP has been fine.  I advised patient to continue current medications.  FU 3 months. \par \par 1/14/22 - Patient reports left lower CP, similar to prior to stent.  Patient reports SOB.   Patient underwent a treadmill stress test and completed 9 minutes of Vasiliy protocol.  There were horizontal ST depressions on ECG but no symptoms.  Following treadmill stress, there was no echocardiographic evidence of ischemia.  I advised patient to try Famotidine 40 mg.\par \par 12/13/21 - Patient has been stable.  Patient reports decreased heart burn.  Patient denies CP or SOB.  I advised patient to continue current medications.  FU 3 months. \par \par 11/15/21 Patient reports feeling much better. No more SOB or dizziness. Patient has focal CP as if something is there,  not related to exertion. Patient reports heart burn in the mornings and would like to try Famotidine. Patient was able to run about the 4 flights of stairs to the office today. \par \par 10/22/21 - Patient is feeling really well after getting stent. Patient is on Aspirin and Plavix. I advised patient to take it easy for 1 month. Patient has LM with 50% blockage. He still needs to continue with cholesterol medication and keep LDL under 70 and to keep BP low. FU in 2 weeks. \par \par 10/11/21 - Patient reports occasional left lower CP.  Patient reports TARIQ.  Patient had EGD and was cleared to take ASA 81 mg.  Patient wished to proceed with cardiac cath at [facility].\par \par 8/30/21 - Patient reports feeling much better since last visit on the medications prescribed. Patient was advised to get EGD and is awaiting endoscopy due to need to take Aspirin. I advised patient that if he does have stomach issues, he may need a stent without the Drug-Eluting stents. Patient was taking Aspirin for 1 year and had black stool and stomach pain so he stopped taking Aspirin at the beginning of this year. I advised patient to continue on Plavix and Statins. Fu in one month. \par \par 8/9/21 - Patient reports that he had been feeling lightheadedness and seeing black, with near syncopal episodes once or twice monthly.. Patient reports that his HR is only 52. Patient reports substernal CP, described as stabbing, not related to exertion, lasting 10 to 45 minutes. Patient reports SOB whole day. Patient also reports palpitations with strong beats. Patient denies h/o syncope. He also reports HA. He is going to get Carotid Doppler.  BP was 130-130-125. He felt slightly dizzy when he stood up. Patient completed Covid Vaccine with no issues.   ECG for CP.   I advised patient to undergo an echocardiogram and a treadmill stress test.  I advised patient to wear a Holter monitor. I advised patient to start on Plavix 75 mg, Diltiazem 30 m and Nitroglycerin 0.4 mg as needed.

## 2022-11-04 NOTE — END OF VISIT
[Time Spent: ___ minutes] : I have spent [unfilled] minutes of time on the encounter. The patient is a 86y Male complaining of chest pain.

## 2023-01-23 ENCOUNTER — APPOINTMENT (OUTPATIENT)
Dept: CARDIOLOGY | Facility: CLINIC | Age: 68
End: 2023-01-23
Payer: MEDICARE

## 2023-01-23 VITALS
SYSTOLIC BLOOD PRESSURE: 119 MMHG | WEIGHT: 163 LBS | TEMPERATURE: 97.8 F | OXYGEN SATURATION: 97 % | BODY MASS INDEX: 26.31 KG/M2 | HEART RATE: 64 BPM | RESPIRATION RATE: 18 BRPM | DIASTOLIC BLOOD PRESSURE: 76 MMHG

## 2023-01-23 PROCEDURE — 99213 OFFICE O/P EST LOW 20 MIN: CPT

## 2023-01-23 RX ORDER — ERGOCALCIFEROL 1.25 MG/1
1.25 MG CAPSULE, LIQUID FILLED ORAL
Qty: 12 | Refills: 1 | Status: ACTIVE | COMMUNITY
Start: 2022-05-25 | End: 1900-01-01

## 2023-01-23 RX ORDER — CLOPIDOGREL BISULFATE 75 MG/1
75 TABLET, FILM COATED ORAL
Qty: 90 | Refills: 1 | Status: ACTIVE | COMMUNITY
Start: 2021-08-12 | End: 1900-01-01

## 2023-01-23 RX ORDER — DILTIAZEM HYDROCHLORIDE 30 MG/1
30 TABLET ORAL
Qty: 180 | Refills: 1 | Status: ACTIVE | COMMUNITY
Start: 2021-08-12 | End: 1900-01-01

## 2023-01-23 RX ORDER — ASPIRIN ENTERIC COATED TABLETS 81 MG 81 MG/1
81 TABLET, DELAYED RELEASE ORAL DAILY
Qty: 90 | Refills: 1 | Status: ACTIVE | COMMUNITY
Start: 2022-03-14 | End: 1900-01-01

## 2023-01-23 NOTE — REASON FOR VISIT
[FreeTextEntry1] : 67 year-old male with CAD s/p LAD stent 10/22/21 at [facility], [place],  presents for followup.  \par \par Patient was last seen on 10/10/22 for followup.  Patient reported that back in September he was feeling weakness and sleepiness but is now better. He reported lower left CP  and SOB with exertion.  His LDL was 83 in June. I advised patient to increase dose of Atorvastatin to 80 mg. I advised patient to undergo nuclear stress test.  Patient underwent a pharmacologic nuclear stress test and it showed normal myocardial perfusion.  Patient underwent a CXR and it showed no lung pathology. \par \par He is on ASA 81 mg, Plavix 75 mg, ant Atorvastatin 80 mg for CAD.  He is on Diltiazem 30 mg BID (HR too slow for Metoprolol) for HTN.  He is OFF Valsartan 80 mg for HTN.  He is on Imdur 30 mg (1/2 tab BID) for CP.\par \[facility] Cath 6/23/22 at [facility] with Dr. Matos showed 40-50 LM stenosis, 50% mid LAD, patent proximal LAD stent, EF 60%.  Patient was started on Imdur 30 mg with improvement in symptom.\par \par Patient underwent a treadmill stress test 1/14/22 and completed 9 minutes of Vasiliy protocol.  There were horizontal ST depressions on ECG but no symptoms.  Following treadmill stress, there was no echocardiographic evidence of ischemia.\par \par Patient underwent an echocardiogram 8/12/21 and it showed normal LV function without significant valvular pathology. \par \[facility] Patient underwent a treadmill stress test 8/12/21 and completed 8.5 minutes of Vasiliy protocol.  There were downsloping ST depressions on ECG with CP.  Following treadmill stress, there was echocardiographic evidence of LAD ischemia.  \par \par Cath at [facility] 10/22/21 by Dr. Matos showed 50% LM, 80% ostial LAD, 95% p LAD, s/p LAD stent.

## 2023-05-01 ENCOUNTER — APPOINTMENT (OUTPATIENT)
Dept: CARDIOLOGY | Facility: CLINIC | Age: 68
End: 2023-05-01

## 2023-09-08 NOTE — HISTORY OF PRESENT ILLNESS
[FreeTextEntry1] : 1/23/23 - Pt still reports lower left CP. He takes Tong Domi Lewis which helps. He also had an episode of exertional diaphoresis. He took Tong Domi Joshua which helped. His LDL improved with Atorvastatin 80 mg reportedly. He takes Imdur 30 mg 1/2 tab once a day sometimes due to improved symptoms.   10/10/22 - Patient reports that back in September he was feeling weakness and sleepiness but is now better. He reports lower left CP  and SOB with exertion. Patient reports that when he takes nitroglycerine he feels better. He reports that he tries to exercise daily. His LDL was 83 in June. I advised patient to increase dose of Atorvastatin to 80 mg. I advised patient to undergo nuclear stress test.  Patient underwent a pharmacologic nuclear stress test and it showed normal myocardial perfusion.  Patient underwent a CXR and it showed no lung pathology.   7/11/22- Patient had cardiac cath and only showed mild blockage. Patient was told to just take 1/2 tab BID of Imdur 30 mg due to headache. Patient reports feeling less CP and no more SOB on Imdur. He also reports better oxygen saturation. I advised patient that he may resume mild exercising. FU in 3 months.   6/13/22 - Patient reports not feeling well.  Patient reports persistent localized left lower CP not related to exertion, lasting hours, worse in AM.  Patient reports episodes of TARIQ.  Patient reports occasional lightheadedness.  I advised patient to undergo a repeat cath.    3/14/22 - Patient reports feeling better with decreased CP.  He still has CP not related to exertion.  He doesn't think it's stomach related.  He takes Famotidine PRN.  Patient denies SOB.  Patient denies palpitations.  He is no longer on Valsartan 80 mg because his BP has been fine.  I advised patient to continue current medications.  FU 3 months.   1/14/22 - Patient reports left lower CP, similar to prior to stent.  Patient reports SOB.   Patient underwent a treadmill stress test and completed 9 minutes of Vasiliy protocol.  There were horizontal ST depressions on ECG but no symptoms.  Following treadmill stress, there was no echocardiographic evidence of ischemia.  I advised patient to try Famotidine 40 mg.  12/13/21 - Patient has been stable.  Patient reports decreased heart burn.  Patient denies CP or SOB.  I advised patient to continue current medications.  FU 3 months.   11/15/21 Patient reports feeling much better. No more SOB or dizziness. Patient has focal CP as if something is there,  not related to exertion. Patient reports heart burn in the mornings and would like to try Famotidine. Patient was able to run about the 4 flights of stairs to the office today.   10/22/21 - Patient is feeling really well after getting stent. Patient is on Aspirin and Plavix. I advised patient to take it easy for 1 month. Patient has LM with 50% blockage. He still needs to continue with cholesterol medication and keep LDL under 70 and to keep BP low. FU in 2 weeks.   10/11/21 - Patient reports occasional left lower CP.  Patient reports TARIQ.  Patient had EGD and was cleared to take ASA 81 mg.  Patient wished to proceed with cardiac cath at [facility].  8/30/21 - Patient reports feeling much better since last visit on the medications prescribed. Patient was advised to get EGD and is awaiting endoscopy due to need to take Aspirin. I advised patient that if he does have stomach issues, he may need a stent without the Drug-Eluting stents. Patient was taking Aspirin for 1 year and had black stool and stomach pain so he stopped taking Aspirin at the beginning of this year. I advised patient to continue on Plavix and Statins. Fu in one month.   8/9/21 - Patient reports that he had been feeling lightheadedness and seeing black, with near syncopal episodes once or twice monthly.. Patient reports that his HR is only 52. Patient reports substernal CP, described as stabbing, not related to exertion, lasting 10 to 45 minutes. Patient reports SOB whole day. Patient also reports palpitations with strong beats. Patient denies h/o syncope. He also reports HA. He is going to get Carotid Doppler.  BP was 130-130-125. He felt slightly dizzy when he stood up. Patient completed Covid Vaccine with no issues.   ECG for CP.   I advised patient to undergo an echocardiogram and a treadmill stress test.  I advised patient to wear a Holter monitor. I advised patient to start on Plavix 75 mg, Diltiazem 30 m and Nitroglycerin 0.4 mg as needed.

## 2023-09-08 NOTE — REASON FOR VISIT
[FreeTextEntry1] : 67 year-old male with CAD s/p LAD stent 10/22/21 at [facility], [facility],  presents for followup.    Patient was last seen on 1/23/23 - Pt still reports lower left CP.  He takes Tong Domi Joshua which helps. He also had an episode of exertional diaphoresis. He took Tong Domi Joshua which helped. His LDL improved with Atorvastatin 80 mg reportedly. He takes Imdur 30 mg 1/2 tab once a day sometimes due to improved symptoms.   He is on ASA 81 mg, Plavix 75 mg, ant Atorvastatin 80 mg for CAD.  He is on Diltiazem 30 mg BID (HR too slow for Metoprolol) for HTN.  He is OFF Valsartan 80 mg for HTN.  He is on Imdur 30 mg (1/2 tab BID) for CP.  Patient underwent a pharmacologic nuclear stress test 10/18/22 and it showed normal myocardial perfusion.  Patient underwent a CXR and it showed no lung pathology.    Cath 6/23/22 at [facility] with Dr. Matos showed 40-50 LM stenosis, 50% mid LAD, patent proximal LAD stent, EF 60%.  Patient was started on Imdur 30 mg with improvement in symptom.  Patient underwent a treadmill stress test 1/14/22 and completed 9 minutes of Vasiliy protocol.  There were horizontal ST depressions on ECG but no symptoms.  Following treadmill stress, there was no echocardiographic evidence of ischemia.  Patient underwent an echocardiogram 8/12/21 and it showed normal LV function without significant valvular pathology.   Patient underwent a treadmill stress test 8/12/21 and completed 8.5 minutes of Vasiliy protocol.  There were downsloping ST depressions on ECG with CP.  Following treadmill stress, there was echocardiographic evidence of LAD ischemia.    Cath at [facility] 10/22/21 by Dr. Matos showed 50% LM, 80% ostial LAD, 95% p LAD, s/p LAD stent.

## 2023-09-11 ENCOUNTER — APPOINTMENT (OUTPATIENT)
Dept: CARDIOLOGY | Facility: CLINIC | Age: 68
End: 2023-09-11
Payer: MEDICARE

## 2023-09-11 ENCOUNTER — NON-APPOINTMENT (OUTPATIENT)
Age: 68
End: 2023-09-11

## 2023-09-11 VITALS
TEMPERATURE: 98.2 F | OXYGEN SATURATION: 96 % | RESPIRATION RATE: 17 BRPM | HEART RATE: 71 BPM | SYSTOLIC BLOOD PRESSURE: 125 MMHG | DIASTOLIC BLOOD PRESSURE: 77 MMHG | WEIGHT: 167 LBS | BODY MASS INDEX: 26.95 KG/M2

## 2023-09-11 DIAGNOSIS — R07.9 CHEST PAIN, UNSPECIFIED: ICD-10-CM

## 2023-09-11 PROCEDURE — 93015 CV STRESS TEST SUPVJ I&R: CPT

## 2023-09-11 PROCEDURE — 93000 ELECTROCARDIOGRAM COMPLETE: CPT | Mod: 59

## 2023-09-11 PROCEDURE — 99214 OFFICE O/P EST MOD 30 MIN: CPT | Mod: 25

## 2023-09-29 ENCOUNTER — APPOINTMENT (OUTPATIENT)
Dept: CARDIOLOGY | Facility: CLINIC | Age: 68
End: 2023-09-29
Payer: MEDICARE

## 2023-09-29 VITALS
HEART RATE: 74 BPM | RESPIRATION RATE: 18 BRPM | OXYGEN SATURATION: 97 % | TEMPERATURE: 97 F | DIASTOLIC BLOOD PRESSURE: 79 MMHG | SYSTOLIC BLOOD PRESSURE: 147 MMHG

## 2023-09-29 PROCEDURE — 93306 TTE W/DOPPLER COMPLETE: CPT

## 2023-12-08 PROBLEM — Z95.820 S/P ANGIOPLASTY WITH STENT: Status: ACTIVE | Noted: 2021-10-29

## 2023-12-08 PROBLEM — I25.10 CAD (CORONARY ARTERY DISEASE), NATIVE CORONARY ARTERY: Status: ACTIVE | Noted: 2021-08-30

## 2023-12-11 ENCOUNTER — APPOINTMENT (OUTPATIENT)
Dept: CARDIOLOGY | Facility: CLINIC | Age: 68
End: 2023-12-11
Payer: MEDICARE

## 2023-12-11 VITALS
HEART RATE: 69 BPM | RESPIRATION RATE: 16 BRPM | OXYGEN SATURATION: 94 % | DIASTOLIC BLOOD PRESSURE: 78 MMHG | BODY MASS INDEX: 26.31 KG/M2 | WEIGHT: 163 LBS | TEMPERATURE: 96.8 F | SYSTOLIC BLOOD PRESSURE: 123 MMHG

## 2023-12-11 DIAGNOSIS — Z95.820 PERIPHERAL VASCULAR ANGIOPLASTY STATUS WITH IMPLANTS AND GRAFTS: ICD-10-CM

## 2023-12-11 DIAGNOSIS — I25.10 ATHEROSCLEROTIC HEART DISEASE OF NATIVE CORONARY ARTERY W/OUT ANGINA PECTORIS: ICD-10-CM

## 2023-12-11 PROCEDURE — 99213 OFFICE O/P EST LOW 20 MIN: CPT

## 2023-12-12 LAB
25(OH)D3 SERPL-MCNC: 40.9 NG/ML
ALBUMIN SERPL ELPH-MCNC: 4.8 G/DL
ALP BLD-CCNC: 74 U/L
ALT SERPL-CCNC: 42 U/L
ANION GAP SERPL CALC-SCNC: 12 MMOL/L
AST SERPL-CCNC: 42 U/L
BILIRUB SERPL-MCNC: 1 MG/DL
BUN SERPL-MCNC: 14 MG/DL
CALCIUM SERPL-MCNC: 9.5 MG/DL
CHLORIDE SERPL-SCNC: 105 MMOL/L
CHOLEST SERPL-MCNC: 188 MG/DL
CO2 SERPL-SCNC: 25 MMOL/L
CREAT SERPL-MCNC: 1.13 MG/DL
EGFR: 72 ML/MIN/1.73M2
ESTIMATED AVERAGE GLUCOSE: 123 MG/DL
GLUCOSE SERPL-MCNC: 106 MG/DL
HBA1C MFR BLD HPLC: 5.9 %
HBV SURFACE AB SER QL: REACTIVE
HBV SURFACE AG SER QL: NONREACTIVE
HCT VFR BLD CALC: 46.4 %
HDLC SERPL-MCNC: 59 MG/DL
HGB BLD-MCNC: 15.5 G/DL
LDLC SERPL CALC-MCNC: 104 MG/DL
MCHC RBC-ENTMCNC: 31.2 PG
MCHC RBC-ENTMCNC: 33.4 GM/DL
MCV RBC AUTO: 93.4 FL
NONHDLC SERPL-MCNC: 129 MG/DL
PLATELET # BLD AUTO: 163 K/UL
POTASSIUM SERPL-SCNC: 4.8 MMOL/L
PROT SERPL-MCNC: 7 G/DL
RBC # BLD: 4.97 M/UL
RBC # FLD: 13.1 %
SODIUM SERPL-SCNC: 143 MMOL/L
TRIGL SERPL-MCNC: 145 MG/DL
TSH SERPL-ACNC: 3.63 UIU/ML
WBC # FLD AUTO: 6.13 K/UL

## 2023-12-12 RX ORDER — EZETIMIBE 10 MG/1
10 TABLET ORAL
Qty: 30 | Refills: 5 | Status: ACTIVE | COMMUNITY
Start: 2023-12-12 | End: 1900-01-01

## 2024-01-02 ENCOUNTER — TRANSCRIPTION ENCOUNTER (OUTPATIENT)
Age: 69
End: 2024-01-02